# Patient Record
Sex: FEMALE | Race: WHITE | Employment: OTHER | URBAN - METROPOLITAN AREA
[De-identification: names, ages, dates, MRNs, and addresses within clinical notes are randomized per-mention and may not be internally consistent; named-entity substitution may affect disease eponyms.]

---

## 2021-11-12 ENCOUNTER — HOSPITAL ENCOUNTER (EMERGENCY)
Age: 71
Discharge: HOME | End: 2021-11-12
Attending: STUDENT IN AN ORGANIZED HEALTH CARE EDUCATION/TRAINING PROGRAM
Payer: MEDICARE

## 2021-11-12 ENCOUNTER — TELEPHONE (OUTPATIENT)
Dept: FAMILY MEDICINE CLINIC | Age: 71
End: 2021-11-12

## 2021-11-12 VITALS
WEIGHT: 173.28 LBS | TEMPERATURE: 98.5 F | OXYGEN SATURATION: 99 % | BODY MASS INDEX: 29.58 KG/M2 | HEIGHT: 64 IN | HEART RATE: 94 BPM | DIASTOLIC BLOOD PRESSURE: 92 MMHG | RESPIRATION RATE: 16 BRPM | SYSTOLIC BLOOD PRESSURE: 210 MMHG

## 2021-11-12 DIAGNOSIS — I10 ASYMPTOMATIC HYPERTENSION: Primary | ICD-10-CM

## 2021-11-12 PROCEDURE — 99283 EMERGENCY DEPT VISIT LOW MDM: CPT

## 2021-11-12 ASSESSMENT — ENCOUNTER SYMPTOMS
ABDOMINAL PAIN: 0
SHORTNESS OF BREATH: 0
PHOTOPHOBIA: 0
VOMITING: 0
STRIDOR: 0
BACK PAIN: 0
COUGH: 0
NAUSEA: 0

## 2021-11-12 NOTE — TELEPHONE ENCOUNTER
----- Message from Mannaresh Austin sent at 11/12/2021  2:48 PM EST -----  Subject: Appointment Request    Reason for Call: Urgent (Patient Request) Existing Condition Follow    QUESTIONS  Type of Appointment? Established Patient  Reason for appointment request? No appointments available during search  Additional Information for Provider? patient wanted to let Dr. Bianca Wayne know   that she is headed over to the Urgent Care after having her bp taken at   the eye doctor, and it was 212. she will call back to follow up with Dr. Bianca Wayne. ---------------------------------------------------------------------------  --------------  Loc [place]  What is the best way for the office to contact you? OK to leave message on   voicemail  Preferred Call Back Phone Number? [phone]  ---------------------------------------------------------------------------  --------------  SCRIPT ANSWERS  Relationship to Patient? Self  (Is the patient requesting to be seen urgently for their symptoms?)? Yes  Is this follow up request related to routine Diabetes Management? No  Are you having any new concerns about your existing condition? No  Have you been diagnosed with, awaiting test results for, or told that you   are suspected of having COVID-19 (Coronavirus)? (If patient has tested   negative or was tested as a requirement for work, school, or travel and   not based on symptoms, answer no)? No  Within the past two weeks have you developed any of the following symptoms   (answer no if symptoms have been present longer than 2 weeks or began   more than 2 weeks ago)? Fever or Chills, Cough, Shortness of breath or   difficulty breathing, Loss of taste or smell, Sore throat, Nasal   congestion, Sneezing or runny nose, Fatigue or generalized body aches   (answer no if pain is specific to a body part e.g. back pain), Diarrhea,   Headache? No  Have you had close contact with someone with COVID-19 in the last 14 days?    No  (Service Expert  click yes below to proceed with [employer] As Usual   Scheduling)?  Yes

## 2021-11-12 NOTE — ED NOTES
Discharge instructions reviewed. Patient verbalized understanding. Patient will follow up with PCP and do random BP checks.        Chanelle Villalta RN  11/12/21 7925

## 2021-11-12 NOTE — ED NOTES
Patient was at eye doctor and BP elevated. Patient has had no headache or vision disturbances. Patient has taken BP medications before and had intolerance to them. Some made her dizzy and one gave swelling of mouth.        Humphrey Conrad RN  11/12/21 0348

## 2021-11-12 NOTE — ED PROVIDER NOTES
[address]      Pt Name: Shruthi Messina  MRN: [id number]  [place] 1950  Date of evaluation: 11/12/2021  Provider: Kaiden Logan DO    CHIEF COMPLAINT       Chief Complaint   Patient presents with    Hypertension     Went to eye doctor and elevated BP. HISTORY OF PRESENT ILLNESS   (Location/Symptom, Timing/Onset, Context/Setting, Quality, Duration, Modifying Factors, Severity)  Note limiting factors. Shruthi Messina is a 70 y.o. female who presents to the emergency department complaining of hypertension. Patient reports that she was checked into her eye doctor and was noted to have hypertension with systolic of 957. Phoned her PCP informing them but they were going to come to the ER for evaluation. Patient is reporting no complaints, states that she went to eye doctor today for a new set of glasses when she was noted to be hypertensive. Patient denies headaches vision change chest pain shortness of breath fatigue leg swelling or any other specific complaint. Feels at baseline. Previously on Maxide however stopped this medication back in 2013, she had been on multiple different blood pressure medications however due to medication reaction or side effects she is not taking these. Has not seen her physician in several years. Did call physician office today however. Nursing Notes were reviewed. PAST MEDICAL HISTORY     Past Medical History:   Diagnosis Date    Hypertension     Irritable bowel syndrome          SURGICAL HISTORY     History reviewed. No pertinent surgical history.       CURRENT MEDICATIONS       Previous Medications    No medications on file       ALLERGIES     Aspirin, Lidocaine, Lisinopril, and Norvasc [amlodipine besylate]    FAMILY HISTORY       Family History   Problem Relation Age of Onset    Cancer Maternal Grandfather           SOCIAL HISTORY       Social History     Socioeconomic History    Marital status:      Spouse name: None    Number of children: None    Years of education: None    Highest education level: None   Occupational History    None   Tobacco Use    Smoking status: Never Smoker    Smokeless tobacco: Never Used   Substance and Sexual Activity    Alcohol use: No    Drug use: No    Sexual activity: None   Other Topics Concern    None   Social History Narrative    None     Social Determinants of Health     Financial Resource Strain:     Difficulty of Paying Living Expenses: Not on file   Food Insecurity:     Worried About Running Out of Food in the Last Year: Not on file    Jluis of Food in the Last Year: Not on file   Transportation Needs:     Lack of Transportation (Medical): Not on file    Lack of Transportation (Non-Medical): Not on file   Physical Activity:     Days of Exercise per Week: Not on file    Minutes of Exercise per Session: Not on file   Stress:     Feeling of Stress : Not on file   Social Connections:     Frequency of Communication with Friends and Family: Not on file    Frequency of Social Gatherings with Friends and Family: Not on file    Attends Che[employer] Services: Not on file    Active Member of [address] [place] [address] or Organizations: Not on file    Attends Club or Organization Meetings: Not on file    Marital Status: Not on file   Intimate Partner Violence:     Fear of Current or Ex-Partner: Not on file    Emotionally Abused: Not on file    Physically Abused: Not on file    Sexually Abused: Not on file   Housing Stability:     Unable to Pay for Housing in the Last Year: Not on file    Number of [place] in the Last Year: Not on file    Unstable Housing in the Last Year: Not on file       SCREENINGS                            REVIEW OF SYSTEMS    (2-9 systems for level 4, 10 or more for level 5)   Review of Systems   Constitutional: Negative. HENT: Negative. Eyes: Negative for photophobia and visual disturbance.    Respiratory: Negative for cough, shortness of breath and stridor. Cardiovascular: Negative for chest pain, palpitations and leg swelling. Gastrointestinal: Negative for abdominal pain, nausea and vomiting. Genitourinary: Negative for decreased urine volume. Musculoskeletal: Negative for back pain, neck pain and neck stiffness. Skin: Negative for rash. Neurological: Negative for seizures, weakness and headaches. Psychiatric/Behavioral: Negative for confusion. PHYSICAL EXAM    (up to 7 for level 4, 8 or more for level 5)   RECENT VITALS:     Temp: 98.5 °F (36.9 °C),  Pulse: 94,  , BP: (!) 210/92, SpO2: 99 %    Physical Exam  Constitutional:       General: She is not in acute distress. Appearance: She is not diaphoretic. HENT:      Head: Normocephalic and atraumatic. Eyes:      Pupils: Pupils are equal, round, and reactive to light. Neck:      Trachea: No tracheal deviation. Cardiovascular:      Rate and Rhythm: Normal rate and regular rhythm. Pulmonary:      Effort: Pulmonary effort is normal. No respiratory distress. Abdominal:      General: There is no distension. Palpations: Abdomen is soft. Musculoskeletal:         General: Normal range of motion. Cervical back: Normal range of motion and neck supple. Right lower leg: No edema. Left lower leg: No edema. Skin:     General: Skin is warm. Neurological:      Mental Status: She is oriented to person, place, and time. DIAGNOSTIC RESULTS     EKG: All EKG's are interpreted by the Emergency Department Physician who either signs or Co-signs this chart in the absence of a cardiologist.      RADIOLOGY:   Non-plain film images such as CT, Ultrasound and MRI are read by the radiologist. Plain radiographic images are visualized and preliminarily interpreted by the emergency physician.     Interpretation per the Radiologist below, if available at the time of this note:    No orders to display         LABS:  Labs Reviewed - No data to display    All other labs were within normal range or not returned as of this dictation. EMERGENCY DEPARTMENT COURSE and DIFFERENTIAL DIAGNOSIS/MDM:   Vi Gilbert is a 70 y.o. female who presents to the emergency department with the complaint of asymptomatic hypertension. Patient was noted to have blood pressure greater than 106 systolic at ophthalmology office today. On arrival she is hypertensive just over 200s. But she is asymptomatic. Denies headache vision change focal deficit chest pain fatigue shortness of breath abdominal pain leg swelling or other complaint. She feels at baseline. At this time do not feel is any indication for lab work or work-up today, asymptomatic hypertension, will refer patient to PCP this coming week for evaluation and initiating medications. Patient was given return precautions including sudden headache development of focal neuro deficit, vision changes chest pain fatigue shortness of breath or leg swelling she needs to return immediately to the ER for reevaluation. CRITICAL CARE TIME   Total Critical Care time was 0 minutes, excluding separately reportable procedures. There was a high probability of clinically significant/life threatening deterioration in the patient's condition which required my urgent intervention. Clinical concern   Intervention     CONSULTS:  None    PROCEDURES:  Unless otherwise noted below, none     Procedures        FINAL IMPRESSION      1.  Asymptomatic hypertension          DISPOSITION/PLAN   DISPOSITION  dc      PATIENT REFERRED TO:  [place] [address] [address]    If symptoms worsen    Fern Rodrigez MD  [place] [id number]  [phone]    Schedule an appointment as soon as possible for a visit in 2 days        DISCHARGE MEDICATIONS:  New Prescriptions    No medications on file     Controlled Substances Monitoring:     No flowsheet data found.    (Please note that portions of this note were completed with a voice recognition program.  Efforts were made to edit the dictations but occasionally words are mis-transcribed.)    Karena Bailey DO (electronically signed)  Attending Emergency Physician            Karena Bailey DO  11/12/21 1600